# Patient Record
Sex: FEMALE | Race: AMERICAN INDIAN OR ALASKA NATIVE | NOT HISPANIC OR LATINO | Employment: UNEMPLOYED | URBAN - METROPOLITAN AREA
[De-identification: names, ages, dates, MRNs, and addresses within clinical notes are randomized per-mention and may not be internally consistent; named-entity substitution may affect disease eponyms.]

---

## 2022-07-12 ENCOUNTER — HOSPITAL ENCOUNTER (EMERGENCY)
Facility: CLINIC | Age: 27
Discharge: HOME | End: 2022-07-12
Attending: PSYCHIATRY & NEUROLOGY | Admitting: PSYCHIATRY & NEUROLOGY
Payer: COMMERCIAL

## 2022-07-12 VITALS
TEMPERATURE: 98.6 F | SYSTOLIC BLOOD PRESSURE: 127 MMHG | HEART RATE: 87 BPM | RESPIRATION RATE: 18 BRPM | DIASTOLIC BLOOD PRESSURE: 82 MMHG | OXYGEN SATURATION: 100 %

## 2022-07-12 DIAGNOSIS — F43.23 ADJUSTMENT DISORDER WITH MIXED ANXIETY AND DEPRESSED MOOD: ICD-10-CM

## 2022-07-12 DIAGNOSIS — F32.1 MAJOR DEPRESSIVE DISORDER, SINGLE EPISODE, MODERATE (H): ICD-10-CM

## 2022-07-12 LAB
AMPHETAMINES UR QL SCN: ABNORMAL
BARBITURATES UR QL: ABNORMAL
BENZODIAZ UR QL: ABNORMAL
CANNABINOIDS UR QL SCN: ABNORMAL
COCAINE UR QL: ABNORMAL
HCG UR QL: NEGATIVE
OPIATES UR QL SCN: ABNORMAL

## 2022-07-12 PROCEDURE — 99285 EMERGENCY DEPT VISIT HI MDM: CPT | Performed by: PSYCHIATRY & NEUROLOGY

## 2022-07-12 PROCEDURE — 80307 DRUG TEST PRSMV CHEM ANLYZR: CPT | Performed by: PSYCHIATRY & NEUROLOGY

## 2022-07-12 PROCEDURE — 90791 PSYCH DIAGNOSTIC EVALUATION: CPT

## 2022-07-12 PROCEDURE — 250N000011 HC RX IP 250 OP 636: Performed by: EMERGENCY MEDICINE

## 2022-07-12 PROCEDURE — 81025 URINE PREGNANCY TEST: CPT | Performed by: PSYCHIATRY & NEUROLOGY

## 2022-07-12 PROCEDURE — 99285 EMERGENCY DEPT VISIT HI MDM: CPT | Mod: 25

## 2022-07-12 PROCEDURE — 250N000013 HC RX MED GY IP 250 OP 250 PS 637: Performed by: EMERGENCY MEDICINE

## 2022-07-12 RX ORDER — IBUPROFEN 600 MG/1
600 TABLET, FILM COATED ORAL ONCE
Status: COMPLETED | OUTPATIENT
Start: 2022-07-12 | End: 2022-07-12

## 2022-07-12 RX ORDER — ONDANSETRON 4 MG/1
4 TABLET, ORALLY DISINTEGRATING ORAL ONCE
Status: COMPLETED | OUTPATIENT
Start: 2022-07-12 | End: 2022-07-12

## 2022-07-12 RX ORDER — HYDROXYZINE HYDROCHLORIDE 10 MG/1
10 TABLET, FILM COATED ORAL 3 TIMES DAILY PRN
Qty: 30 TABLET | Refills: 0 | Status: SHIPPED | OUTPATIENT
Start: 2022-07-12

## 2022-07-12 RX ORDER — ESCITALOPRAM OXALATE 5 MG/1
5 TABLET ORAL DAILY
Qty: 30 TABLET | Refills: 0 | Status: SHIPPED | OUTPATIENT
Start: 2022-07-12

## 2022-07-12 RX ADMIN — ONDANSETRON 4 MG: 4 TABLET, ORALLY DISINTEGRATING ORAL at 16:46

## 2022-07-12 RX ADMIN — IBUPROFEN 600 MG: 600 TABLET ORAL at 16:56

## 2022-07-12 ASSESSMENT — ENCOUNTER SYMPTOMS
NERVOUS/ANXIOUS: 1
SLEEP DISTURBANCE: 1
RESPIRATORY NEGATIVE: 1
HALLUCINATIONS: 0
MUSCULOSKELETAL NEGATIVE: 1
NEUROLOGICAL NEGATIVE: 1
EYES NEGATIVE: 1
HYPERACTIVE: 0
CONSTITUTIONAL NEGATIVE: 1
DECREASED CONCENTRATION: 1
CARDIOVASCULAR NEGATIVE: 1
GASTROINTESTINAL NEGATIVE: 1

## 2022-07-12 NOTE — DISCHARGE INSTRUCTIONS
Start trial of escitalopram (Lexapro) 5 mg daily to manage your mood symptoms  Add trial of hydroxyzine 10 mg as needed for acute anxiety until Lexapro takes effect    Follow-up [facility]-referred mental health services and providers    You have been given the forms to apply for insurance through [employer]. You can also fill out this application online at [email or web address]. Please call [phone] to get assistance with completing your application.     You have been referred to the [facility]. This outpatient service provides short-term, VIRTUAL therapy and/or medication management sessions until you begin scheduled services with long-term care providers. Our staff will contact you to schedule. If you have questions, call Transition Clinic at [phone].      Aftercare Plan  If I am feeling unsafe or I am in a crisis, I will:   Contact my established care providers   Call the National Suicide Prevention Lifeline: 993.468.3543   Go to the nearest emergency room   Call 734     Warning signs that I or other people might notice when a crisis is developing for me: feeling hopeless, suicidal thoughts, being alone for too long    Things I am able to do on my own to cope or help me feel better: play with dogs, watch TV, go for a walk    Things that I am able to do with others to cope or help me better: Talk to family     Things I can use or do for distraction: Watch TV, go outside      Changes I can make to support my mental health and wellness: Attend therapy, take medication as prescribed, do not use alcohol or drugs     People in my life that I can ask for help: Mom, sister, aunt     Your [facility] has a mental health crisis team you can call 24/7: [facility] Mobile Crisis  [phone] (adults)  [phone] (children)    Other things that are important when I'm in crisis: Ask for support, remember that you are worth it and deserve to feel better       Crisis Lines  Crisis Text Line  Text  "279542  You will be connected with a trained live crisis counselor to provide support.    Por espanol, texto  SANDY a 163593 o texto a 442-AYUDAME en WhatsApp    The Bro Project (LGBTQ Youth Crisis Line)  6.911.698.6932  text START to 670-268      Community Resources  Fast Tracker  Linking people to mental health and substance use disorder resources  [employer].ScholarPRO     [place] Mental Health Warm Line  Peer to peer support  Monday thru Saturday, 12 pm to 10 pm  169.138.8506 or 8.166.910.5230  Text \"Support\" to 79199    National [place] on Mental Illness (ANTHONY)  268.001.0087 or 1.888.ANTHONY.HELPS      Mental Health Apps  My3  https://uSamp/    CADFORCE  [email or web address]/apps/virtual-hope-box/      Crisis Lines  Crisis Text Line  Text 717746  You will be connected with a trained live crisis counselor to provide support.    Por julietanol, texto  SANDY a 202817 o texto a 442-AYUDAME en WhatsApp    National Hope Line  1.800.SUICIDE [9169844]      Community Resources  Fast Tracker  Linking people to mental health and substance use disorder resources  [employer].ScholarPRO     [place] Mental Health Warm Line  Peer to peer support  Monday thru Saturday, 12 pm to 10 pm  327.132.2149 or 0.242.972.5736  Text \"Support\" to 62563    National [place] on Mental Illness (ANTHONY)  444.897.0329 or 1.888.ANTHONY.HELPS      Mental Health Apps  My3  https://[employer].org/    NoovoeBox  https://Sajan.org/apps/virtual-hope-box/      "
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
## 2022-07-12 NOTE — ED TRIAGE NOTES
Triage Assessment     Row Name 07/12/22 1513       Triage Assessment (Adult)    Airway WDL WDL       Respiratory WDL    Respiratory WDL WDL       Skin Circulation/Temperature WDL    Skin Circulation/Temperature WDL WDL       Cardiac WDL    Cardiac WDL WDL       Peripheral/Neurovascular WDL    Peripheral Neurovascular WDL WDL
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
## 2022-07-13 ENCOUNTER — TELEPHONE (OUTPATIENT)
Dept: BEHAVIORAL HEALTH | Facility: CLINIC | Age: 27
End: 2022-07-13

## 2022-07-13 NOTE — TELEPHONE ENCOUNTER
First attempt to contact pt. Roger left a VM with TC contact info and encouraged a phone call back to schedule initial therapy appointment. Writer will postpone for tomorrow.    Jen Barcenasrera  07/13/22  917    ----- Message from Yue Mace sent at 7/12/2022  9:59 PM CDT -----  Regarding: Patient Referral  Transition Clinic Referral   [place] Only   Limited [place] Availability    Type of Referral:    _x___Therapy Only   ____Medication Only: Referral will automatically be declined if no next level of care scheduled. Suboxone and Opioid management referrals are automatically denied.  ____Therapy & Medication:  Referral will automatically be declined if no next level of care scheduled. Suboxone and Opioid management referrals are automatically denied.  ____Diagnostic Assessment     Suboxone and Opioid Management Referrals are Automatically Denied    [facility] Psychiatry cannot see patients who do not have active medical insurance    Referring Provider Name: Yue Mace    Clinician completing the assessment. Colette Gold    Referring Provider: [facility] ([facility]) CLINICIAN     If known, referring provider contact name: Dr. Kumar Phone Number: n/a  Service Line/Location: n/a    Reason for Transition Clinic Referral: support while patient gets insured and connects with long term therapist    Next Level of Care Patient Will Be Transitioned To: outpatient therapy and psychiatry  Provider(s): TBD  Location : [facility] Psychiatry cannot see patients who do not have active medical insurance    What Would Be Helpful from the Transition Clinic: Patient is between jobs so does not have insurance. Coordinator talked through the [employer] application together. She is going to apply for insurance, and call sliding scale fee providers. If possible, would benefit from transition clinic for therapeutic support while she finds a provider.      Needs: NO    Does Patient Have Access to Technology:  yes    Patient E-mail Address: No e-mail address on record    Current Patient Phone Number: [phone];     Clinician Gender Preference (if applicable): NO    Yue Mace

## 2022-07-14 ENCOUNTER — TELEPHONE (OUTPATIENT)
Dept: BEHAVIORAL HEALTH | Facility: CLINIC | Age: 27
End: 2022-07-14

## 2022-07-14 NOTE — TELEPHONE ENCOUNTER
Writer spoke with patient and patient stated they would call back at a later time to schedule. Writer provided TC contact information to patient and encouraged a call back. Referral will be marked as complete as two attempts made.    Jen Gonsalves  07/14/22  816    ----- Message from Yue Mace sent at 7/12/2022  9:59 PM CDT -----  Regarding: Patient Referral  Transition Clinic Referral   [place] Only   Limited [place] Availability    Type of Referral:    _x___Therapy Only   ____Medication Only: Referral will automatically be declined if no next level of care scheduled. Suboxone and Opioid management referrals are automatically denied.  ____Therapy & Medication:  Referral will automatically be declined if no next level of care scheduled. Suboxone and Opioid management referrals are automatically denied.  ____Diagnostic Assessment     Suboxone and Opioid Management Referrals are Automatically Denied    [facility] Psychiatry cannot see patients who do not have active medical insurance    Referring Provider Name: Yeu Mace    Clinician completing the assessment. Colette Gold    Referring Provider: [facility] ([facility]) CLINICIAN     If known, referring provider contact name: Dr. Kumar Phone Number: n/a  Service Line/Location: n/a    Reason for Transition Clinic Referral: support while patient gets insured and connects with long term therapist    Next Level of Care Patient Will Be Transitioned To: outpatient therapy and psychiatry  Provider(s): TBD  Location : [facility] Psychiatry cannot see patients who do not have active medical insurance    What Would Be Helpful from the Transition Clinic: Patient is between jobs so does not have insurance. Coordinator talked through the [employer] application together. She is going to apply for insurance, and call sliding scale fee providers. If possible, would benefit from transition clinic for therapeutic support while she finds a provider.      Needs:  NO    Does Patient Have Access to Technology: yes    Patient E-mail Address: No e-mail address on record    Current Patient Phone Number: [phone];     Clinician Gender Preference (if applicable): NO    Yue Mace